# Patient Record
Sex: FEMALE | URBAN - METROPOLITAN AREA
[De-identification: names, ages, dates, MRNs, and addresses within clinical notes are randomized per-mention and may not be internally consistent; named-entity substitution may affect disease eponyms.]

---

## 2024-10-22 ENCOUNTER — CLAIMS CREATED FROM THE CLAIM WINDOW (OUTPATIENT)
Dept: URBAN - METROPOLITAN AREA MEDICAL CENTER 25 | Facility: MEDICAL CENTER | Age: 74
End: 2024-10-22
Payer: COMMERCIAL

## 2024-10-22 DIAGNOSIS — R13.19 DYSPHAGIA: ICD-10-CM

## 2024-10-22 DIAGNOSIS — I48.0 PAF (PAROXYSMAL ATRIAL FIBRILLATION): ICD-10-CM

## 2024-10-22 PROCEDURE — 99204 OFFICE O/P NEW MOD 45 MIN: CPT | Performed by: INTERNAL MEDICINE

## 2024-10-22 PROCEDURE — 99254 IP/OBS CNSLTJ NEW/EST MOD 60: CPT | Performed by: INTERNAL MEDICINE

## 2024-10-23 ENCOUNTER — LAB OUTSIDE AN ENCOUNTER (OUTPATIENT)
Dept: URBAN - METROPOLITAN AREA MEDICAL CENTER 25 | Facility: MEDICAL CENTER | Age: 74
End: 2024-10-23

## 2024-10-23 ENCOUNTER — TELEPHONE ENCOUNTER (OUTPATIENT)
Dept: URBAN - METROPOLITAN AREA CLINIC 19 | Facility: CLINIC | Age: 74
End: 2024-10-23

## 2024-10-23 PROBLEM — 40739000: Status: ACTIVE | Noted: 2024-10-23

## 2024-10-23 PROBLEM — 63305008: Status: ACTIVE | Noted: 2024-10-23

## 2024-10-24 ENCOUNTER — OFFICE VISIT (OUTPATIENT)
Dept: URBAN - METROPOLITAN AREA MEDICAL CENTER 25 | Facility: MEDICAL CENTER | Age: 74
End: 2024-10-24

## 2024-10-24 RX ORDER — APIXABAN 2.5 MG/1
AS DIRECTED TABLET, FILM COATED ORAL
Status: ACTIVE | COMMUNITY

## 2024-11-04 ENCOUNTER — OFFICE VISIT (OUTPATIENT)
Dept: URBAN - METROPOLITAN AREA CLINIC 19 | Facility: CLINIC | Age: 74
End: 2024-11-04
Payer: COMMERCIAL

## 2024-11-04 ENCOUNTER — DASHBOARD ENCOUNTERS (OUTPATIENT)
Age: 74
End: 2024-11-04

## 2024-11-04 VITALS
BODY MASS INDEX: 20.99 KG/M2 | HEIGHT: 65 IN | HEART RATE: 82 BPM | WEIGHT: 126 LBS | TEMPERATURE: 96.3 F | SYSTOLIC BLOOD PRESSURE: 128 MMHG | DIASTOLIC BLOOD PRESSURE: 82 MMHG

## 2024-11-04 DIAGNOSIS — R13.10 ESOPHAGEAL DYSPHAGIA: ICD-10-CM

## 2024-11-04 DIAGNOSIS — K29.71 GASTRITIS WITH HEMORRHAGE, UNSPECIFIED CHRONICITY, UNSPECIFIED GASTRITIS TYPE: ICD-10-CM

## 2024-11-04 DIAGNOSIS — Z09 HOSPITAL DISCHARGE FOLLOW-UP: ICD-10-CM

## 2024-11-04 DIAGNOSIS — K59.09 CHRONIC CONSTIPATION: ICD-10-CM

## 2024-11-04 PROCEDURE — 99214 OFFICE O/P EST MOD 30 MIN: CPT | Performed by: NURSE PRACTITIONER

## 2024-11-04 RX ORDER — APIXABAN 2.5 MG/1
AS DIRECTED TABLET, FILM COATED ORAL
Status: ACTIVE | COMMUNITY

## 2024-11-04 RX ORDER — OMEPRAZOLE 40 MG/1
1 CAPSULE 30 MINUTES BEFORE MEAL CAPSULE, DELAYED RELEASE ORAL TWICE DAILY
Qty: 120 | Refills: 0 | OUTPATIENT
Start: 2024-11-04

## 2024-11-04 NOTE — PHYSICAL EXAM CONSTITUTIONAL:
Well developed, well nourished, in no acute distress, ambulating without difficulty, normal communication ability Diabetes type I    Hyperthyroidism

## 2024-11-04 NOTE — HPI-TODAY'S VISIT:
74-year-old female with PMH of A-fib with sick sinus syndrome s/p pacemaker presents for hospital follow-up. Admitted to [facility] 10/21/2024 after she took a dose of amiodarone and felt to get stuck in her throat, attempted to drink water several times however continued to have feeling of pill being stuck.  CT soft tissue neck was negative.  10/24/2024 EGD by Dr. Bacon No endoscopic esophageal abnormality to explain dysphagia, dilated.  Small hiatal hernia.  Chronic erosive gastritis with hemorrhage characterized by adherent blood, erosions erythema and friability.  Normal duodenum. If dysphagia continues perform barium swallow and manometry random gastric biopsy-mildly increased chronic inflammation; negative for H. pylori.  Midesophagus-negative   She reports some improvement of dysphagia but still having some issue. Even having issue with saliva. She states her usual GI doctor Dr. Wang instructed her to start Omeprazole but she has not yet started. 2020 barium swallow was normal.  She reports chronic constipation - has been taking Trulance PRN (due to affecting BP) which has worked well for her along with prunes, oatmeal and apple. Had tried Linzess which caused her too much pain Has hx of episiomtomy so therefore has some pelvic dysfunction

## 2024-11-04 NOTE — PHYSICAL EXAM HENT:
Normocephalic, atraumatic, face within normal limits, external ears within normal limits, external nose normal appearance, nares patent, no nasal discharge, lips appear normal

## 2024-11-08 ENCOUNTER — TELEPHONE ENCOUNTER (OUTPATIENT)
Dept: URBAN - METROPOLITAN AREA CLINIC 19 | Facility: CLINIC | Age: 74
End: 2024-11-08

## 2024-11-14 ENCOUNTER — TELEPHONE ENCOUNTER (OUTPATIENT)
Dept: URBAN - METROPOLITAN AREA CLINIC 19 | Facility: CLINIC | Age: 74
End: 2024-11-14

## 2024-12-26 ENCOUNTER — APPOINTMENT (OUTPATIENT)
Dept: URBAN - METROPOLITAN AREA CLINIC 159 | Facility: CLINIC | Age: 74
Setting detail: DERMATOLOGY
End: 2024-12-26

## 2024-12-26 DIAGNOSIS — D22 MELANOCYTIC NEVI: ICD-10-CM

## 2024-12-26 DIAGNOSIS — L82.1 OTHER SEBORRHEIC KERATOSIS: ICD-10-CM

## 2024-12-26 DIAGNOSIS — L20.89 OTHER ATOPIC DERMATITIS: ICD-10-CM | Status: INADEQUATELY CONTROLLED

## 2024-12-26 DIAGNOSIS — L72.0 EPIDERMAL CYST: ICD-10-CM

## 2024-12-26 DIAGNOSIS — Z71.89 OTHER SPECIFIED COUNSELING: ICD-10-CM

## 2024-12-26 DIAGNOSIS — D18.0 HEMANGIOMA: ICD-10-CM

## 2024-12-26 DIAGNOSIS — L64.8 OTHER ANDROGENIC ALOPECIA: ICD-10-CM

## 2024-12-26 PROBLEM — D18.01 HEMANGIOMA OF SKIN AND SUBCUTANEOUS TISSUE: Status: ACTIVE | Noted: 2024-12-26

## 2024-12-26 PROBLEM — D22.5 MELANOCYTIC NEVI OF TRUNK: Status: ACTIVE | Noted: 2024-12-26

## 2024-12-26 PROCEDURE — ? COUNSELING

## 2024-12-26 PROCEDURE — ? PRESCRIPTION

## 2024-12-26 PROCEDURE — 99204 OFFICE O/P NEW MOD 45 MIN: CPT

## 2024-12-26 PROCEDURE — ? PRESCRIPTION MEDICATION MANAGEMENT

## 2024-12-26 RX ORDER — TACROLIMUS 1 MG/G
OINTMENT TOPICAL
Qty: 30 | Refills: 0 | Status: ERX | COMMUNITY
Start: 2024-12-26

## 2024-12-26 RX ORDER — TRIAMCINOLONE ACETONIDE 1 MG/G
OINTMENT TOPICAL
Qty: 15 | Refills: 0 | Status: ERX | COMMUNITY
Start: 2024-12-26

## 2024-12-26 RX ADMIN — TACROLIMUS: 1 OINTMENT TOPICAL at 00:00

## 2024-12-26 RX ADMIN — TRIAMCINOLONE ACETONIDE: 1 OINTMENT TOPICAL at 00:00

## 2024-12-26 ASSESSMENT — LOCATION SIMPLE DESCRIPTION DERM
LOCATION SIMPLE: LEFT EAR
LOCATION SIMPLE: LEFT UPPER BACK
LOCATION SIMPLE: RIGHT UPPER BACK
LOCATION SIMPLE: NOSE
LOCATION SIMPLE: SCALP

## 2024-12-26 ASSESSMENT — LOCATION DETAILED DESCRIPTION DERM
LOCATION DETAILED: LEFT SUPERIOR UPPER BACK
LOCATION DETAILED: LEFT MEDIAL UPPER BACK
LOCATION DETAILED: NASAL ROOT
LOCATION DETAILED: RIGHT INFERIOR MEDIAL UPPER BACK
LOCATION DETAILED: LEFT SUPERIOR HELIX
LOCATION DETAILED: LEFT INFERIOR MEDIAL UPPER BACK
LOCATION DETAILED: RIGHT SUPERIOR PARIETAL SCALP
LOCATION DETAILED: RIGHT SUPERIOR MEDIAL UPPER BACK

## 2024-12-26 ASSESSMENT — LOCATION ZONE DERM
LOCATION ZONE: TRUNK
LOCATION ZONE: EAR
LOCATION ZONE: NOSE
LOCATION ZONE: SCALP

## 2024-12-26 ASSESSMENT — SEVERITY ASSESSMENT 2020: SEVERITY 2020: MILD

## 2024-12-26 ASSESSMENT — PAIN INTENSITY VAS: HOW INTENSE IS YOUR PAIN 0 BEING NO PAIN, 10 BEING THE MOST SEVERE PAIN POSSIBLE?: NO PAIN

## 2024-12-26 NOTE — PROCEDURE: COUNSELING
Detail Level: Generalized
Detail Level: Zone
Patient Specific Counseling (Will Not Stick From Patient To Patient): 12/26/2024\\n>Recommended Nutrafol, Rogaine, prp injections; she is not interested in oral treatment options\\n>reviewed oral treatment options, potential side effects; she declines oral options
Patient Specific Counseling (Will Not Stick From Patient To Patient): =================12/26/2024 > \\nregarding left ear, on exam there are NO physical exam findings aside from very slight scale c/w seb derm or xerosis, no lesions, no growths; she reports her primary issue there is some itch and irritation of the skin; we will trial tacrolimus; she understands to return to clinic if not improving or becoming more frequently irritated, or any lesions growing in the area \\n>upper back with mild atopic dermatitis \\n\\n.\\n- Counseled the patient on topical steroid use. Should avoid use on face, near eyes, skin folds, or genitals unless otherwise instructed. Counseled that prolonged use can cause striae, thinning of the skin. Therefore, should not use beyond few weeks straight unless otherwise instructed, without taking breaks of at least a week.
Detail Level: Detailed
Patient Specific Counseling (Will Not Stick From Patient To Patient): Recommended seeing [facility]

## 2024-12-26 NOTE — PROCEDURE: PRESCRIPTION MEDICATION MANAGEMENT
Initiate Treatment: triamcinolone acetonide 0.1 % topical ointment : Apply thin even layer to affected areas on back twice daily up to 14 days, then use as needed.\\ntacrolimus 0.1 % topical ointment : Apply small amount to affected areas on left ear twice daily as needed
Render In Strict Bullet Format?: No
Detail Level: Zone

## 2025-05-05 ENCOUNTER — OFFICE VISIT (OUTPATIENT)
Dept: URBAN - METROPOLITAN AREA CLINIC 19 | Facility: CLINIC | Age: 75
End: 2025-05-05

## 2025-06-26 ENCOUNTER — APPOINTMENT (OUTPATIENT)
Dept: URBAN - METROPOLITAN AREA CLINIC 159 | Facility: CLINIC | Age: 75
Setting detail: DERMATOLOGY
End: 2025-06-26

## 2025-06-26 DIAGNOSIS — L20.89 OTHER ATOPIC DERMATITIS: ICD-10-CM

## 2025-06-26 PROCEDURE — ? PRESCRIPTION MEDICATION MANAGEMENT

## 2025-06-26 PROCEDURE — ? COUNSELING

## 2025-06-26 PROCEDURE — ? OTC TREATMENT REGIMEN

## 2025-06-26 PROCEDURE — ?

## 2025-06-26 ASSESSMENT — LOCATION ZONE DERM
LOCATION ZONE: EAR
LOCATION ZONE: TRUNK

## 2025-06-26 ASSESSMENT — LOCATION DETAILED DESCRIPTION DERM
LOCATION DETAILED: LEFT SUPERIOR HELIX
LOCATION DETAILED: RIGHT SUPERIOR MEDIAL UPPER BACK

## 2025-06-26 ASSESSMENT — LOCATION SIMPLE DESCRIPTION DERM
LOCATION SIMPLE: RIGHT UPPER BACK
LOCATION SIMPLE: LEFT EAR

## 2025-06-26 NOTE — PROCEDURE: PRESCRIPTION MEDICATION MANAGEMENT
Discontinue Regimen: triamcinolone acetonide 0.1 % topical ointment \\nQuantity: 15.0 g  Days Supply: 30\\nSig: Apply thin even layer to affected areas on back twice daily up to 14 days, then use as needed.\\n\\ntacrolimus 0.1 % topical ointment \\nQuantity: 30.0 g  Days Supply: 30\\nSig: Apply small amount to affected areas on left ear twice daily as needed
Render In Strict Bullet Format?: No
Detail Level: Zone

## 2025-06-26 NOTE — PROCEDURE: COUNSELING
Patient Specific Counseling (Will Not Stick From Patient To Patient): =====================6/26/2025> \\nAgain, no physical exam findings today\\n>left ear, helix, she gets chronic itch and discomfort\\n> diagnosis unclear, could be intermittent atopic dermatitis , but nothing on exam, only the physical complaint of itch at this time\\n>she reports redness comes and goes, few times monthly\\n>will trial anti itch creams otc (dermeleve), topical lidocaine, sarna\\n>she has seen ENT as well who had no findings \\n>advised to take photos when flaring\\n>she is NOT side sleeper, so not likely [facility]\\n@@may consider opzelura or similar next visit\\n>she will return to clinic sooner than 6 months if not getting better\\n>failed tacrolimus and topical steroids \\n**This problem is considered chronic exacerbated, given the diagnosis and time course described by the patient (and/or) the possible time course that may be expected in the future \\n=================12/26/2024 > \\nregarding left ear, on exam there are NO physical exam findings aside from very slight scale c/w seb derm or xerosis, no lesions, no growths; she reports her primary issue there is some itch and irritation of the skin; we will trial tacrolimus; she understands to return to clinic if not improving or becoming more frequently irritated, or any lesions growing in the area \\n>upper back with mild atopic dermatitis \\n\\n.\\n- Counseled the patient on topical steroid use. Should avoid use on face, near eyes, skin folds, or genitals unless otherwise instructed. Counseled that prolonged use can cause striae, thinning of the skin. Therefore, should not use beyond few weeks straight unless otherwise instructed, without taking breaks of at least a week.
Detail Level: Zone

## 2025-06-26 NOTE — PROCEDURE: OTC TREATMENT REGIMEN
Patient Specific Otc Recommendations (Will Not Stick From Patient To Patient): ====================06/26/2025\\n>benadryl cream, dermeleve cream ([employer]), lidocaine pain gel (pt given written list of OTC rec's)
Detail Level: Zone
Patient/Caregiver provided printed discharge information.